# Patient Record
Sex: FEMALE | Race: WHITE | Employment: STUDENT | URBAN - METROPOLITAN AREA
[De-identification: names, ages, dates, MRNs, and addresses within clinical notes are randomized per-mention and may not be internally consistent; named-entity substitution may affect disease eponyms.]

---
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
## 2025-04-08 ENCOUNTER — APPOINTMENT (OUTPATIENT)
Dept: RADIOLOGY | Facility: CLINIC | Age: 20
End: 2025-04-08
Payer: COMMERCIAL

## 2025-04-08 ENCOUNTER — APPOINTMENT (OUTPATIENT)
Dept: PRIMARY CARE | Facility: CLINIC | Age: 20
End: 2025-04-08
Payer: COMMERCIAL

## 2025-04-08 VITALS
WEIGHT: 143 LBS | DIASTOLIC BLOOD PRESSURE: 68 MMHG | HEIGHT: 65 IN | BODY MASS INDEX: 23.82 KG/M2 | SYSTOLIC BLOOD PRESSURE: 134 MMHG

## 2025-04-08 DIAGNOSIS — N92.1 MENORRHAGIA WITH IRREGULAR CYCLE: Primary | ICD-10-CM

## 2025-04-08 PROCEDURE — 99214 OFFICE O/P EST MOD 30 MIN: CPT | Performed by: FAMILY MEDICINE

## 2025-04-08 PROCEDURE — 3008F BODY MASS INDEX DOCD: CPT | Performed by: FAMILY MEDICINE

## 2025-04-08 NOTE — PROGRESS NOTES
"Subjective     Patient ID: [id number]     Mckenzie Horvath is a 20 y.o. female who presents for irregular periods.    HPI  Heavy menses but once/month.  Heavy since teenager but worsening.  Some dizziness.  Since last menses starting light flow.  Menses usually painful.    Typically menses last 7 days.    Mother and siblings have Factor V Leiden.    Gluten free diet.  Employed as a manager at [employer].    Has appointment with gynecologist in June.    Objective   /68   Ht 1.651 m (5' 5\")   Wt 64.9 kg (143 lb)   BMI 23.80 kg/m²    Physical Exam:   Alert and oriented ×3.  No acute distress.  No tremors noted.  Gait is normal.  Mood and affect are normal.    Assessment/Plan   1. Menorrhagia with irregular cycle (Primary)    - CBC  - Factor V Leiden  - US pelvis; Future  - Iron and TIBC    -Prolonged discussion regarding oral contraceptive agents and IUD as treatment.  Will need to know factor V Leiden status prior to use of hormones.    - Follow-up with gynecologist, Dr. Stubbs, in June    Problem List Items Addressed This Visit    None      Vikram Perez, DO   "

## 2025-04-10 LAB
ERYTHROCYTE [DISTWIDTH] IN BLOOD BY AUTOMATED COUNT: 13.1 % (ref 11–15)
F5 GENE MUT ANL BLD/T: NORMAL
F5 P.R506Q BLD/T QL: NORMAL
HCT VFR BLD AUTO: 39.7 % (ref 35–45)
HGB BLD-MCNC: 13.3 G/DL (ref 11.7–15.5)
IRON SATN MFR SERPL: 24 % (CALC) (ref 16–45)
IRON SERPL-MCNC: 75 MCG/DL (ref 40–190)
MCH RBC QN AUTO: 30.2 PG (ref 27–33)
MCHC RBC AUTO-ENTMCNC: 33.5 G/DL (ref 32–36)
MCV RBC AUTO: 90 FL (ref 80–100)
PLATELET # BLD AUTO: 259 THOUSAND/UL (ref 140–400)
PMV BLD REES-ECKER: 11.2 FL (ref 7.5–12.5)
RBC # BLD AUTO: 4.41 MILLION/UL (ref 3.8–5.1)
TIBC SERPL-MCNC: 313 MCG/DL (CALC) (ref 250–450)
WBC # BLD AUTO: 3 THOUSAND/UL (ref 3.8–10.8)

## 2025-04-18 ENCOUNTER — HOSPITAL ENCOUNTER (OUTPATIENT)
Dept: RADIOLOGY | Facility: CLINIC | Age: 20
End: 2025-04-18
Payer: COMMERCIAL

## 2025-04-18 LAB
ERYTHROCYTE [DISTWIDTH] IN BLOOD BY AUTOMATED COUNT: 13.1 % (ref 11–15)
F5 GENE MUT ANL BLD/T: NORMAL
F5 P.R506Q BLD/T QL: NEGATIVE
HCT VFR BLD AUTO: 39.7 % (ref 35–45)
HGB BLD-MCNC: 13.3 G/DL (ref 11.7–15.5)
IRON SATN MFR SERPL: 24 % (CALC) (ref 16–45)
IRON SERPL-MCNC: 75 MCG/DL (ref 40–190)
MCH RBC QN AUTO: 30.2 PG (ref 27–33)
MCHC RBC AUTO-ENTMCNC: 33.5 G/DL (ref 32–36)
MCV RBC AUTO: 90 FL (ref 80–100)
PLATELET # BLD AUTO: 259 THOUSAND/UL (ref 140–400)
PMV BLD REES-ECKER: 11.2 FL (ref 7.5–12.5)
RBC # BLD AUTO: 4.41 MILLION/UL (ref 3.8–5.1)
TIBC SERPL-MCNC: 313 MCG/DL (CALC) (ref 250–450)
WBC # BLD AUTO: 3 THOUSAND/UL (ref 3.8–10.8)

## 2025-04-26 ENCOUNTER — HOSPITAL ENCOUNTER (OUTPATIENT)
Dept: RADIOLOGY | Facility: CLINIC | Age: 20
Discharge: HOME | End: 2025-04-26
Payer: COMMERCIAL

## 2025-04-26 DIAGNOSIS — N92.1 MENORRHAGIA WITH IRREGULAR CYCLE: ICD-10-CM

## 2025-04-26 PROCEDURE — 76830 TRANSVAGINAL US NON-OB: CPT | Performed by: STUDENT IN AN ORGANIZED HEALTH CARE EDUCATION/TRAINING PROGRAM

## 2025-04-26 PROCEDURE — 76856 US EXAM PELVIC COMPLETE: CPT | Performed by: STUDENT IN AN ORGANIZED HEALTH CARE EDUCATION/TRAINING PROGRAM

## 2025-04-26 PROCEDURE — 76856 US EXAM PELVIC COMPLETE: CPT

## 2025-04-28 DIAGNOSIS — N83.201 HEMORRHAGIC CYST OF RIGHT OVARY: Primary | ICD-10-CM

## 2025-04-30 ENCOUNTER — TELEPHONE (OUTPATIENT)
Dept: PRIMARY CARE | Facility: CLINIC | Age: 20
End: 2025-04-30
Payer: COMMERCIAL

## 2025-04-30 DIAGNOSIS — N83.201 CYST OF RIGHT OVARY: Primary | ICD-10-CM

## 2025-04-30 NOTE — TELEPHONE ENCOUNTER
----- Message from Audra VELAZQUEZ sent at 4/29/2025  2:31 PM EDT -----  Pt is aware  Can you please put a order in for 8 week follow up?  ----- Message -----  From: Vikram Perez DO  Sent: 4/28/2025  11:41 PM EDT  To: Jeanie Valdes, CMA    Right ovary has a cyst.  Radiologist recommending repeat ultrasound in 8 wks  ----- Message -----  From: Risa, Radiology Results In  Sent: 4/28/2025   6:40 AM EDT  To: Vikram Perez DO

## 2025-05-08 ENCOUNTER — APPOINTMENT (OUTPATIENT)
Dept: CARDIOLOGY | Facility: HOSPITAL | Age: 20
End: 2025-05-08
Payer: COMMERCIAL

## 2025-05-08 ENCOUNTER — HOSPITAL ENCOUNTER (EMERGENCY)
Facility: HOSPITAL | Age: 20
Discharge: HOME | End: 2025-05-08
Payer: COMMERCIAL

## 2025-05-08 VITALS
HEIGHT: 65 IN | HEART RATE: 80 BPM | TEMPERATURE: 97.9 F | SYSTOLIC BLOOD PRESSURE: 124 MMHG | DIASTOLIC BLOOD PRESSURE: 87 MMHG | RESPIRATION RATE: 18 BRPM | BODY MASS INDEX: 22.49 KG/M2 | WEIGHT: 135 LBS | OXYGEN SATURATION: 99 %

## 2025-05-08 DIAGNOSIS — F41.0 PANIC ATTACK: Primary | ICD-10-CM

## 2025-05-08 PROCEDURE — 2500000001 HC RX 250 WO HCPCS SELF ADMINISTERED DRUGS (ALT 637 FOR MEDICARE OP)

## 2025-05-08 PROCEDURE — 99284 EMERGENCY DEPT VISIT MOD MDM: CPT

## 2025-05-08 PROCEDURE — 99283 EMERGENCY DEPT VISIT LOW MDM: CPT

## 2025-05-08 PROCEDURE — 93005 ELECTROCARDIOGRAM TRACING: CPT

## 2025-05-08 RX ORDER — LORAZEPAM 1 MG/1
1 TABLET ORAL ONCE
Status: COMPLETED | OUTPATIENT
Start: 2025-05-08 | End: 2025-05-08

## 2025-05-08 RX ADMIN — LORAZEPAM 1 MG: 1 TABLET ORAL at 17:18

## 2025-05-08 ASSESSMENT — LIFESTYLE VARIABLES
EVER HAD A DRINK FIRST THING IN THE MORNING TO STEADY YOUR NERVES TO GET RID OF A HANGOVER: NO
TOTAL SCORE: 0
HAVE PEOPLE ANNOYED YOU BY CRITICIZING YOUR DRINKING: NO
HAVE YOU EVER FELT YOU SHOULD CUT DOWN ON YOUR DRINKING: NO
EVER FELT BAD OR GUILTY ABOUT YOUR DRINKING: NO

## 2025-05-08 ASSESSMENT — COLUMBIA-SUICIDE SEVERITY RATING SCALE - C-SSRS
1. IN THE PAST MONTH, HAVE YOU WISHED YOU WERE DEAD OR WISHED YOU COULD GO TO SLEEP AND NOT WAKE UP?: NO
6. HAVE YOU EVER DONE ANYTHING, STARTED TO DO ANYTHING, OR PREPARED TO DO ANYTHING TO END YOUR LIFE?: NO
2. HAVE YOU ACTUALLY HAD ANY THOUGHTS OF KILLING YOURSELF?: NO

## 2025-05-08 ASSESSMENT — PAIN - FUNCTIONAL ASSESSMENT: PAIN_FUNCTIONAL_ASSESSMENT: 0-10

## 2025-05-08 ASSESSMENT — PAIN SCALES - GENERAL: PAINLEVEL_OUTOF10: 0 - NO PAIN

## 2025-05-08 NOTE — Clinical Note
Mckenzie Horvath was seen and treated in our emergency department on 5/8/2025.  She may return to work on 05/09/2025.       If you have any questions or concerns, please don't hesitate to call.      Katrina Clinton PA-C

## 2025-05-08 NOTE — DISCHARGE INSTRUCTIONS
Would recommend following up with your therapist.  I put in a referral to psychiatry if you want to start new medication for your anxiety and panic attacks.  Even though the previous medications did not help you, I do believe that there is a medication out there that will help you.  Sometimes, psychiatry can do blood testing to see which specific anxiety medications will work better for a person

## 2025-05-08 NOTE — ED PROVIDER NOTES
"HPI   Chief Complaint   Patient presents with    Panic Attack       20-year-old female with PMH of ADHD, PTSD, anxiety present today for panic attack.  States approximately an hour ago, she had an acute onset panic attack and had to pull her car over to the side of the road.  States that she felt lightheaded with associated palpitations.  No current lightheadedness, chest pain, shortness of breath or palpitations.  States that the panic attack feels similar to previous panic attacks except this time, her head \"feels like it is going to explode\".  States that she had no headache prior to the panic attack and states that she does not truly describe the pressure in her head as a true headache.  States that compared to when the panic attack started, the anxiety has gone down.  Denies SI or HI.  Not on any anxiety medicine currently as she does not believe anything in the past has helped her with her anxiety.  Used to see a psychiatrist but does not see them anymore.  States that she sees therapist sporadically.              Patient History   Medical History[1]  Surgical History[2]  Family History[3]  Social History[4]    Physical Exam   ED Triage Vitals [05/08/25 1630]   Temperature Heart Rate Respirations BP   36.6 °C (97.9 °F) 80 18 124/87      Pulse Ox Temp Source Heart Rate Source Patient Position   99 % Temporal Monitor Sitting      BP Location FiO2 (%)     Right arm --       Physical Exam  Vitals and nursing note reviewed.   Constitutional:       General: She is not in acute distress.     Appearance: Normal appearance. She is not ill-appearing.   HENT:      Head: Normocephalic and atraumatic.      Right Ear: External ear normal.      Left Ear: External ear normal.      Nose: Nose normal.      Mouth/Throat:      Mouth: Mucous membranes are moist.   Eyes:      Extraocular Movements: Extraocular movements intact.      Conjunctiva/sclera: Conjunctivae normal.      Pupils: Pupils are equal, round, and reactive to " light.   Cardiovascular:      Rate and Rhythm: Normal rate and regular rhythm.      Heart sounds: Normal heart sounds.   Pulmonary:      Effort: No accessory muscle usage or respiratory distress.      Breath sounds: Normal breath sounds. No wheezing, rhonchi or rales.   Abdominal:      General: Abdomen is flat. Bowel sounds are normal. There is no distension.      Palpations: Abdomen is soft.      Tenderness: There is no abdominal tenderness. There is no right CVA tenderness or left CVA tenderness.   Musculoskeletal:         General: No swelling or deformity. Normal range of motion.      Cervical back: Normal range of motion and neck supple.      Right lower leg: No edema.      Left lower leg: No edema.   Skin:     General: Skin is warm and dry.      Capillary Refill: Capillary refill takes less than 2 seconds.   Neurological:      General: No focal deficit present.      Mental Status: She is alert and oriented to person, place, and time.      GCS: GCS eye subscore is 4. GCS verbal subscore is 5. GCS motor subscore is 6.      Cranial Nerves: Cranial nerves 2-12 are intact.      Sensory: No sensory deficit.      Motor: Motor function is intact. No weakness.   Psychiatric:         Mood and Affect: Mood and affect normal.         Speech: Speech normal.         Behavior: Behavior normal. Behavior is cooperative.           ED Course & MDM   ED Course as of 05/08/25 2008   Thu May 08, 2025   1655 EKG: Sinus rhythm at 68 bpm.  .  QRS 96.  QTc 387.  Normal axis.  No ST elevations, no acute ischemic pattern [PS]   1716 ECG 12 lead [ML]      ED Course User Index  [ML] Katrina Clinton PA-C  [PS] Quintin Gee DO         Diagnoses as of 05/08/25 2008   Panic attack                 No data recorded     Gabriela Coma Scale Score: 15 (05/08/25 1631 : Abril Bearden, APRN-ALEKS)                           Medical Decision Making  20-year-old female here today for panic attack.  Has had panic attacks in the past, feels  similar.  States symptoms have gotten better since arriving to ED.  EKG obtained showing NSR, no signs of ischemia.  Administered 1 dose of Ativan, states that she is feeling better after the Ativan.  Denies SI or HI.  Open to seeing outpatient psychiatry to discuss initiation of other medication.  Discussed that she should also follow-up with her therapist.  Discussed return precautions.  Discussed plan of care with parent at bedside        Procedure  Procedures       [1]   Past Medical History:  Diagnosis Date    Abnormal weight gain 03/03/2021    Abnormal weight gain    Acute nasopharyngitis (common cold) 12/13/2019    Acute nasopharyngitis    Acute vaginitis 08/08/2013    Vulvovaginitis    Contact with and (suspected) exposure to covid-19 12/30/2021    Encounter for laboratory testing for COVID-19 virus    Contact with and (suspected) exposure to other viral communicable diseases 12/30/2021    Contact with or exposure to viral disease    Enteroviral vesicular pharyngitis 04/06/2021    Herpangina    Gluten intolerance 09/25/2014    Laceration without foreign body of unspecified finger without damage to nail, initial encounter 10/29/2018    Finger laceration    Lichen simplex chronicus 07/11/2017    Lichen    Other specified disorders of left ear 07/20/2021    Abnormal sensation in left ear    Other viral warts 06/15/2017    Other viral warts    Other viral warts 06/15/2017    Other viral warts    Personal history of diseases of the skin and subcutaneous tissue 08/06/2021    History of impetigo    Personal history of other diseases of the digestive system 02/15/2014    History of constipation    Personal history of other diseases of the nervous system and sense organs 07/20/2021    History of tinnitus    Personal history of other diseases of the respiratory system 11/12/2020    History of acute pharyngitis    Personal history of other diseases of the respiratory system     History of sore throat    Personal  history of other diseases of the respiratory system 12/30/2021    History of sore throat    Personal history of other diseases of the respiratory system 11/12/2020    History of acute pharyngitis    Personal history of other diseases of the respiratory system 02/09/2021    History of acute pharyngitis    Personal history of other diseases of the respiratory system 03/25/2019    History of acute pharyngitis    Personal history of other diseases of the respiratory system 12/30/2021    History of sore throat    Personal history of other diseases of the respiratory system 04/06/2021    History of acute pharyngitis    Personal history of other diseases of the respiratory system 07/09/2016    History of acute pharyngitis    Personal history of other diseases of urinary system 02/01/2014    History of hematuria    Personal history of other infectious and parasitic diseases 08/06/2021    History of tinea corporis    Personal history of other infectious and parasitic diseases 12/30/2021    History of viral infection    Personal history of other infectious and parasitic diseases 12/13/2019    History of viral infection    Personal history of other specified conditions 02/01/2014    History of urinary frequency    Personal history of other specified conditions 07/23/2021    History of tachycardia    Plantar wart     Verruca pedis    Rash and other nonspecific skin eruption 09/30/2020    Perianal rash   [2]   Past Surgical History:  Procedure Laterality Date    CT ANGIO NECK  7/21/2023    CT NECK ANGIO W AND WO IV CONTRAST 7/21/2023   [3]   Family History  Problem Relation Name Age of Onset    Diabetes Mother      Cancer Mother      Other (blood clot) Mother      No Known Problems Father     [4]   Social History  Tobacco Use    Smoking status: Never    Smokeless tobacco: Current    Tobacco comments:     Vapes tobacco on and off since age 15   Vaping Use    Vaping status: Every Day    Substances: Nicotine   Substance Use Topics     Alcohol use: Never    Drug use: Not Currently     Types: Marijuana        Katrina Clinton PA-C  05/08/25 2009

## 2025-05-08 NOTE — ED TRIAGE NOTES
"Pt to ED with anxiety/panic attack that started about an hour ago, states she felt lightheaded and like \"my head was going to explode\" when the attack started, states she has a history of panic attacks has tried multiple medications in the past with nothing helping her panic attacks   "

## 2025-05-09 LAB
ATRIAL RATE: 68 BPM
P AXIS: 70 DEGREES
P OFFSET: 202 MS
P ONSET: 152 MS
PR INTERVAL: 134 MS
Q ONSET: 219 MS
QRS COUNT: 12 BEATS
QRS DURATION: 86 MS
QT INTERVAL: 364 MS
QTC CALCULATION(BAZETT): 387 MS
QTC FREDERICIA: 379 MS
R AXIS: 77 DEGREES
T AXIS: 72 DEGREES
T OFFSET: 401 MS
VENTRICULAR RATE: 68 BPM

## 2025-06-21 ENCOUNTER — OFFICE VISIT (OUTPATIENT)
Dept: URGENT CARE | Age: 20
End: 2025-06-21
Payer: COMMERCIAL

## 2025-06-21 VITALS
BODY MASS INDEX: 22.47 KG/M2 | WEIGHT: 135 LBS | RESPIRATION RATE: 16 BRPM | TEMPERATURE: 98.6 F | SYSTOLIC BLOOD PRESSURE: 104 MMHG | DIASTOLIC BLOOD PRESSURE: 70 MMHG | OXYGEN SATURATION: 98 % | HEART RATE: 80 BPM

## 2025-06-21 DIAGNOSIS — N39.0 ACUTE UTI: Primary | ICD-10-CM

## 2025-06-21 DIAGNOSIS — R30.9 URINARY PAIN: ICD-10-CM

## 2025-06-21 LAB
POC APPEARANCE, URINE: ABNORMAL
POC BILIRUBIN, URINE: NEGATIVE
POC BLOOD, URINE: ABNORMAL
POC COLOR, URINE: YELLOW
POC GLUCOSE, URINE: NEGATIVE MG/DL
POC KETONES, URINE: NEGATIVE MG/DL
POC LEUKOCYTES, URINE: ABNORMAL
POC NITRITE,URINE: NEGATIVE
POC PH, URINE: 7 PH
POC PROTEIN, URINE: NEGATIVE MG/DL
POC SPECIFIC GRAVITY, URINE: 1.01
POC UROBILINOGEN, URINE: 0.2 EU/DL
PREGNANCY TEST URINE, POC: NEGATIVE

## 2025-06-21 RX ORDER — NITROFURANTOIN 25; 75 MG/1; MG/1
100 CAPSULE ORAL 2 TIMES DAILY
Qty: 14 CAPSULE | Refills: 0 | Status: SHIPPED | OUTPATIENT
Start: 2025-06-21 | End: 2025-06-28

## 2025-06-21 ASSESSMENT — ENCOUNTER SYMPTOMS
DYSURIA: 1
HEMATURIA: 1

## 2025-06-21 NOTE — PROGRESS NOTES
Subjective   Patient ID: Mckenzie Horvath is a 20 y.o. female. They present today with a chief complaint of Urinary Problem.    History of Present Illness  Patient complains of dysuria, urinary urgency and mild hematuria that she began to develop this morning.  Patient denies fever, chills, flank pain, nausea or other symptoms.  Patient has a history of UTI and states that her current symptoms are consistent with same.      Past Medical History  Allergies as of 06/21/2025 - Reviewed 06/21/2025   Allergen Reaction Noted    Penicillins Hives, Rash, and Fever 03/05/2019     Prescriptions Prior to Admission[1]     Medical History[2]    Surgical History[3]     reports that she has never smoked. She uses smokeless tobacco. She reports that she does not currently use drugs after having used the following drugs: Marijuana. She reports that she does not drink alcohol.    Review of Systems  Review of Systems   Genitourinary:  Positive for dysuria, hematuria and urgency.   All other systems reviewed and are negative.    Objective    Vitals:    06/21/25 1902   BP: 104/70   Pulse: 80   Resp: 16   Temp: 37 °C (98.6 °F)   SpO2: 98%   Weight: 61.2 kg (135 lb)     Patient's last menstrual period was 06/02/2025.    Physical Exam  Vitals and nursing note reviewed.   Constitutional:       Appearance: Normal appearance. She is normal weight.   HENT:      Head: Normocephalic.      Mouth/Throat:      Mouth: Mucous membranes are moist.      Pharynx: Oropharynx is clear.   Eyes:      Extraocular Movements: Extraocular movements intact.      Conjunctiva/sclera: Conjunctivae normal.      Pupils: Pupils are equal, round, and reactive to light.   Pulmonary:      Effort: Pulmonary effort is normal.      Breath sounds: Normal breath sounds.   Abdominal:      General: Abdomen is flat.      Palpations: Abdomen is soft.   Skin:     General: Skin is warm and dry.      Capillary Refill: Capillary refill takes less than 2 seconds.   Neurological:       General: No focal deficit present.      Mental Status: She is alert and oriented to person, place, and time.   Psychiatric:         Mood and Affect: Mood normal.         Behavior: Behavior normal.         Thought Content: Thought content normal.         Judgment: Judgment normal.     Point of Care Test & Imaging Results from this visit  No results found for this visit on 06/21/25.   Imaging  No results found.    Cardiology, Vascular, and Other Imaging  No other imaging results found for the past 2 days      Diagnostic study results (if any) were reviewed by [facility].    Assessment/Plan   Allergies, medications, history, and pertinent labs/EKGs/Imaging reviewed by Monico Edwards PA-C.     Medical Decision Making  Physical exam findings as noted above.  Urinalysis shows leukocyte esterase with blood and urine culture was ordered.  Urine hCG is negative.  Patient has a significant allergy to penicillin and was provided with a prescription for Macrobid 100 mg.  Patient was advised that results of the urine culture will be available in 2 days and she will be contacted if there is a need to change her medication based on the sensitivity report.    CLINICAL IMPRESSION:  Acute UTI    Orders and Diagnoses  There are no diagnoses linked to this encounter.    Medical Admin Record    Patient disposition: Home    Electronically signed by [facility]  7:04 PM           [1] (Not in a hospital admission)  [2]   Past Medical History:  Diagnosis Date    Abnormal weight gain 03/03/2021    Abnormal weight gain    Acute nasopharyngitis (common cold) 12/13/2019    Acute nasopharyngitis    Acute vaginitis 08/08/2013    Vulvovaginitis    Contact with and (suspected) exposure to covid-19 12/30/2021    Encounter for laboratory testing for COVID-19 virus    Contact with and (suspected) exposure to other viral communicable diseases 12/30/2021    Contact with or exposure to viral disease    Enteroviral vesicular pharyngitis  04/06/2021    Herpangina    Gluten intolerance 09/25/2014    Laceration without foreign body of unspecified finger without damage to nail, initial encounter 10/29/2018    Finger laceration    Lichen simplex chronicus 07/11/2017    Lichen    Other specified disorders of left ear 07/20/2021    Abnormal sensation in left ear    Other viral warts 06/15/2017    Other viral warts    Other viral warts 06/15/2017    Other viral warts    Personal history of diseases of the skin and subcutaneous tissue 08/06/2021    History of impetigo    Personal history of other diseases of the digestive system 02/15/2014    History of constipation    Personal history of other diseases of the nervous system and sense organs 07/20/2021    History of tinnitus    Personal history of other diseases of the respiratory system 11/12/2020    History of acute pharyngitis    Personal history of other diseases of the respiratory system     History of sore throat    Personal history of other diseases of the respiratory system 12/30/2021    History of sore throat    Personal history of other diseases of the respiratory system 11/12/2020    History of acute pharyngitis    Personal history of other diseases of the respiratory system 02/09/2021    History of acute pharyngitis    Personal history of other diseases of the respiratory system 03/25/2019    History of acute pharyngitis    Personal history of other diseases of the respiratory system 12/30/2021    History of sore throat    Personal history of other diseases of the respiratory system 04/06/2021    History of acute pharyngitis    Personal history of other diseases of the respiratory system 07/09/2016    History of acute pharyngitis    Personal history of other diseases of urinary system 02/01/2014    History of hematuria    Personal history of other infectious and parasitic diseases 08/06/2021    History of tinea corporis    Personal history of other infectious and parasitic diseases 12/30/2021     History of viral infection    Personal history of other infectious and parasitic diseases 12/13/2019    History of viral infection    Personal history of other specified conditions 02/01/2014    History of urinary frequency    Personal history of other specified conditions 07/23/2021    History of tachycardia    Plantar wart     Verruca pedis    Rash and other nonspecific skin eruption 09/30/2020    Perianal rash   [3]   Past Surgical History:  Procedure Laterality Date    CT ANGIO NECK  7/21/2023    CT NECK ANGIO W AND WO IV CONTRAST 7/21/2023

## 2025-06-24 LAB — BACTERIA UR CULT: ABNORMAL

## 2025-06-27 ENCOUNTER — TELEPHONE (OUTPATIENT)
Dept: PRIMARY CARE | Facility: CLINIC | Age: 20
End: 2025-06-27
Payer: COMMERCIAL

## 2025-06-27 NOTE — TELEPHONE ENCOUNTER
Mom called in saying Mckenzie has bad eczema acne so she was requesting a dermatologist referral , per Glendy said that most insurances don't require referrals no more but that she would have to check with her insurance. Glendy also said since Mckenzie has only seen NT once she would have to schedule an appointment for an evaluation since its been a year that she has not seen patient. Informed patient.

## 2025-08-26 ENCOUNTER — OFFICE VISIT (OUTPATIENT)
Dept: PRIMARY CARE | Facility: CLINIC | Age: 20
End: 2025-08-26
Payer: COMMERCIAL

## 2025-08-26 VITALS
TEMPERATURE: 97.6 F | HEIGHT: 65 IN | SYSTOLIC BLOOD PRESSURE: 116 MMHG | WEIGHT: 151 LBS | BODY MASS INDEX: 25.16 KG/M2 | DIASTOLIC BLOOD PRESSURE: 68 MMHG

## 2025-08-26 DIAGNOSIS — Z30.011 ENCOUNTER FOR INITIAL PRESCRIPTION OF CONTRACEPTIVE PILLS: ICD-10-CM

## 2025-08-26 DIAGNOSIS — Z00.00 WELLNESS EXAMINATION: Primary | ICD-10-CM

## 2025-08-26 DIAGNOSIS — Z11.3 ROUTINE SCREENING FOR STI (SEXUALLY TRANSMITTED INFECTION): ICD-10-CM

## 2025-08-26 PROCEDURE — 3008F BODY MASS INDEX DOCD: CPT | Performed by: FAMILY MEDICINE

## 2025-08-26 PROCEDURE — 99395 PREV VISIT EST AGE 18-39: CPT | Performed by: FAMILY MEDICINE

## 2025-08-26 RX ORDER — NORGESTIMATE AND ETHINYL ESTRADIOL 7DAYSX3 28
1 KIT ORAL DAILY
Qty: 28 TABLET | Refills: 12 | Status: SHIPPED | OUTPATIENT
Start: 2025-08-26 | End: 2025-08-26

## 2025-08-26 RX ORDER — NORGESTIMATE AND ETHINYL ESTRADIOL 7DAYSX3 LO
1 KIT ORAL DAILY
Qty: 28 TABLET | Refills: 12 | Status: SHIPPED | OUTPATIENT
Start: 2025-08-26 | End: 2026-08-26

## 2025-08-26 ASSESSMENT — PATIENT HEALTH QUESTIONNAIRE - PHQ9
2. FEELING DOWN, DEPRESSED OR HOPELESS: NOT AT ALL
1. LITTLE INTEREST OR PLEASURE IN DOING THINGS: NOT AT ALL
SUM OF ALL RESPONSES TO PHQ9 QUESTIONS 1 AND 2: 0

## 2025-08-27 LAB
C TRACH RRNA SPEC QL NAA+PROBE: NOT DETECTED
N GONORRHOEA RRNA SPEC QL NAA+PROBE: NOT DETECTED
QUEST GC CT AMPLIFIED (ALWAYS MESSAGE): NORMAL

## 2025-11-25 ENCOUNTER — APPOINTMENT (OUTPATIENT)
Dept: PRIMARY CARE | Facility: CLINIC | Age: 20
End: 2025-11-25
Payer: COMMERCIAL

## 2026-09-01 ENCOUNTER — APPOINTMENT (OUTPATIENT)
Dept: PRIMARY CARE | Facility: CLINIC | Age: 21
End: 2026-09-01
Payer: COMMERCIAL